# Patient Record
Sex: MALE | Race: WHITE | URBAN - METROPOLITAN AREA
[De-identification: names, ages, dates, MRNs, and addresses within clinical notes are randomized per-mention and may not be internally consistent; named-entity substitution may affect disease eponyms.]

---

## 2024-07-11 ENCOUNTER — OFFICE VISIT (OUTPATIENT)
Dept: URGENT CARE | Facility: CLINIC | Age: 59
End: 2024-07-11
Payer: COMMERCIAL

## 2024-07-11 VITALS
TEMPERATURE: 97.7 F | WEIGHT: 207 LBS | HEIGHT: 71 IN | SYSTOLIC BLOOD PRESSURE: 118 MMHG | DIASTOLIC BLOOD PRESSURE: 84 MMHG | RESPIRATION RATE: 18 BRPM | HEART RATE: 92 BPM | BODY MASS INDEX: 28.98 KG/M2 | OXYGEN SATURATION: 95 %

## 2024-07-11 DIAGNOSIS — J22 LOWER RESPIRATORY TRACT INFECTION: Primary | ICD-10-CM

## 2024-07-11 PROBLEM — J45.20 MILD INTERMITTENT ASTHMA WITHOUT COMPLICATION: Status: ACTIVE | Noted: 2024-07-11

## 2024-07-11 PROCEDURE — S9083 URGENT CARE CENTER GLOBAL: HCPCS | Performed by: PHYSICIAN ASSISTANT

## 2024-07-11 PROCEDURE — G0382 LEV 3 HOSP TYPE B ED VISIT: HCPCS | Performed by: PHYSICIAN ASSISTANT

## 2024-07-11 RX ORDER — AZITHROMYCIN 250 MG/1
TABLET, FILM COATED ORAL
Qty: 6 TABLET | Refills: 0 | Status: SHIPPED | OUTPATIENT
Start: 2024-07-11 | End: 2024-07-15

## 2024-07-11 RX ORDER — ENALAPRIL MALEATE 2.5 MG/1
2.5 TABLET ORAL DAILY
COMMUNITY

## 2024-07-11 RX ORDER — SIMVASTATIN 5 MG
5 TABLET ORAL DAILY
COMMUNITY

## 2024-07-11 RX ORDER — METHYLPREDNISOLONE 4 MG/1
TABLET ORAL
Qty: 1 EACH | Refills: 0 | Status: SHIPPED | OUTPATIENT
Start: 2024-07-11

## 2024-07-11 RX ORDER — ALBUTEROL SULFATE 90 UG/1
2 AEROSOL, METERED RESPIRATORY (INHALATION) EVERY 6 HOURS PRN
Qty: 8.5 G | Refills: 0 | Status: SHIPPED | OUTPATIENT
Start: 2024-07-11

## 2024-07-11 NOTE — PROGRESS NOTES
[facility]        NAME: Jonah Lr is a 59 y.o. male  : 1965    MRN: [id number]  DATE: 2024  TIME: 9:52 AM    Assessment and Plan   Lower respiratory tract infection [J22]  1. Lower respiratory tract infection  azithromycin (ZITHROMAX) 250 mg tablet    albuterol (ProAir HFA) 90 mcg/act inhaler    methylPREDNISolone 4 MG tablet therapy pack          Patient with lower respiratory tract infection history of asthma we will treat him with azithromycin also prescribed albuterol inhaler in addition to a Medrol Dosepak.  No evidence of consolidation or decreased breath sounds on exam I did have some rhonchi and slight wheezes.  He was instructed to follow-up with the ER with any worsening    The patient verbalized understanding of exam findings and treatment plan.   We engaged in the shared decision-making process and treatment options were   discussed at length with the patient.  All questions, concerns and  complaints were answered and addressed to the patient's satisfaction.    Patient Instructions   There are no Patient Instructions on file for this visit.    Follow up with PCP in 3-5 days.  Proceed to  ER if symptoms worsen.    If tests are performed, our office will contact you with results only if   changes need to made to the care plan discussed with you at the visit.   [facility]     Chief Complaint     Chief Complaint   Patient presents with   • Cough     Pt c/o a dry cough for 4 days that is now productive. Pt also c/o chest/nasal congestion and fever 100.3-100.5 for the past 3 days.   OTC meds: mucinex, tylenol         History of Present Illness       HPI  Patient presents complaining of dry cough for the past 4 days has become productive with yellow mucus he is temperatures ranged from 100.3-100.5.  He has nasal and chest congestion as well.  His children were sick with a sore throat over the last 2 weeks.  Denies any chest pain or  "shortness of breath.  No wheezing but he does have history of asthma no longer any medications.    Review of Systems   Review of Systems  All other related systems reviewed and are negative except as noted in HPI    Current Medications       Current Outpatient Medications:   •  albuterol (ProAir HFA) 90 mcg/act inhaler, Inhale 2 puffs every 6 (six) hours as needed for wheezing, Disp: 8.5 g, Rfl: 0  •  azithromycin (ZITHROMAX) 250 mg tablet, Take 2 tablets today then 1 tablet daily x 4 days, Disp: 6 tablet, Rfl: 0  •  enalapril (VASOTEC) 2.5 mg tablet, Take 2.5 mg by mouth daily Pt unsure of dose., Disp: , Rfl:   •  methylPREDNISolone 4 MG tablet therapy pack, Use as directed on package, Disp: 1 each, Rfl: 0  •  simvastatin (ZOCOR) 5 MG tablet, Take 5 mg by mouth in the morning Pt unsure of dose., Disp: , Rfl:     Current Allergies     Allergies as of 07/11/2024 - Reviewed 07/11/2024   Allergen Reaction Noted   • Penicillins Other (See Comments) 05/18/2018            The following portions of the patient's history were reviewed and updated as appropriate: allergies, current medications, past family history, past medical history, past social history, past surgical history and problem list.     Past Medical History:   Diagnosis Date   • Hypertension        History reviewed. No pertinent surgical history.    History reviewed. No pertinent family history.      Medications have been verified.        Objective   /84   Pulse 92   Temp 97.7 °F (36.5 °C)   Resp 18   Ht 5' 11\" (1.803 m)   Wt 93.9 kg (207 lb)   SpO2 95%   BMI 28.87 kg/m²   No LMP for male patient.       Physical Exam     Physical Exam  Constitutional:       General: He is not in acute distress.     Appearance: He is well-developed.   HENT:      Head: Normocephalic and atraumatic.      Mouth/Throat:      Mouth: Mucous membranes are moist.      Pharynx: No posterior oropharyngeal erythema.   Eyes:      General: No scleral icterus.     " "Conjunctiva/sclera: Conjunctivae normal.   Cardiovascular:      Rate and Rhythm: Normal rate and regular rhythm.      Heart sounds: Normal heart sounds. No murmur heard.  Pulmonary:      Effort: Pulmonary effort is normal. No respiratory distress.      Breath sounds: No stridor. Wheezing and rhonchi present. No rales.   Musculoskeletal:      Cervical back: Normal range of motion and neck supple.   Skin:     General: Skin is warm and dry.   Neurological:      Mental Status: He is alert and oriented to person, place, and time.   Psychiatric:         Behavior: Behavior normal.         Ortho Exam        Procedures  No Procedures performed today        Note: Portions of this record may have been created with voice recognition software. Occasional wrong word or \"sound a like\" substitutions may have occurred due to the inherent limitations of voice recognition software. Please read the chart carefully and recognize, using context, where substitutions have occurred.*      "